# Patient Record
Sex: MALE | Race: WHITE | NOT HISPANIC OR LATINO | Employment: PART TIME | URBAN - NONMETROPOLITAN AREA
[De-identification: names, ages, dates, MRNs, and addresses within clinical notes are randomized per-mention and may not be internally consistent; named-entity substitution may affect disease eponyms.]

---

## 2017-03-29 ENCOUNTER — NON-PROVIDER VISIT (OUTPATIENT)
Dept: URGENT CARE | Facility: PHYSICIAN GROUP | Age: 42
End: 2017-03-29

## 2017-03-29 DIAGNOSIS — Z02.1 PRE-EMPLOYMENT DRUG SCREENING: ICD-10-CM

## 2017-03-29 PROCEDURE — 8907 PR URINE COLLECT ONLY: Performed by: PHYSICIAN ASSISTANT

## 2018-11-26 ENCOUNTER — OCCUPATIONAL MEDICINE (OUTPATIENT)
Dept: URGENT CARE | Facility: PHYSICIAN GROUP | Age: 43
End: 2018-11-26
Payer: COMMERCIAL

## 2018-11-26 VITALS
OXYGEN SATURATION: 100 % | WEIGHT: 215 LBS | TEMPERATURE: 97.9 F | RESPIRATION RATE: 14 BRPM | HEART RATE: 62 BPM | HEIGHT: 67 IN | DIASTOLIC BLOOD PRESSURE: 108 MMHG | SYSTOLIC BLOOD PRESSURE: 170 MMHG | BODY MASS INDEX: 33.74 KG/M2

## 2018-11-26 DIAGNOSIS — S65.311A LACERATION OF DEEP PALMAR ARCH OF RIGHT HAND, INITIAL ENCOUNTER: ICD-10-CM

## 2018-11-26 DIAGNOSIS — Z02.1 PRE-EMPLOYMENT DRUG SCREENING: Primary | ICD-10-CM

## 2018-11-26 LAB
BREATH ALCOHOL COMMENT: NORMAL
POC BREATHALIZER: NORMAL PERCENT (ref 0–0.01)

## 2018-11-26 PROCEDURE — 12001 RPR S/N/AX/GEN/TRNK 2.5CM/<: CPT | Mod: 29 | Performed by: PHYSICIAN ASSISTANT

## 2018-11-26 PROCEDURE — 80305 DRUG TEST PRSMV DIR OPT OBS: CPT | Performed by: PHYSICIAN ASSISTANT

## 2018-11-26 PROCEDURE — 90715 TDAP VACCINE 7 YRS/> IM: CPT | Mod: 29 | Performed by: PHYSICIAN ASSISTANT

## 2018-11-26 PROCEDURE — 82075 ASSAY OF BREATH ETHANOL: CPT | Performed by: PHYSICIAN ASSISTANT

## 2018-11-26 NOTE — PROGRESS NOTES
"Chief Complaint   Patient presents with   • Work-Related Injury     NEW Wc for R knuckle laceration/ DOI: 11/26/18/ [employer]       HISTORY OF PRESENT ILLNESS: Patient is a 43 y.o. male who presents today for the following:    DOI: 11/26/18, first visit  Patient was trying to pull a core when he got his hand caught between the corner and another firm space, causing a laceration on the ulnar aspect of the second MCP of the right hand.  Patient is full range of motion.  He denies distal paresthesias.  His last tetanus shot was more than 10 years ago.    There are no active problems to display for this patient.      Allergies:Patient has no known allergies.    No current [employer]ordered outpatient prescriptions on file.     No current [employer]ordered facility-administered medications on file.        No past medical history on file.    Social History   Substance Use Topics   • Smoking status: Former Smoker     Types: Cigarettes   • Smokeless tobacco: Current User     Types: Chew   • Alcohol use 0.6 oz/week     1 Cans of beer per week       No family status information on file.   No family history on file.    Review of Systems:   Constitutional ROS: No unexpected change in weight, No weakness, No fatigue  Eye ROS: No recent significant change in vision, No eye pain, redness, discharge  Ear ROS: No drainage, No tinnitus or vertigo, No recent change in hearing  Mouth/Throat ROS: No teeth or gum problems, No bleeding gums, No tongue complaints  Neck ROS: No swollen glands, No significant pain in neck  Pulmonary ROS: No chronic cough, sputum, or hemoptysis, No dyspnea on exertion, No wheezing  Cardiovascular ROS: No diaphoresis, No edema, No palpitations  Gastrointestinal ROS: No change in bowel habits, No significant change in appetite, No nausea, vomiting, diarrhea, or constipation    Exam:  Blood pressure (!) 170/108, pulse 62, temperature 36.6 °C (97.9 °F), temperature source Temporal, resp. rate 14, height 1.702 m (5' 7\"), " weight 97.5 kg (215 lb), SpO2 100 %.  General: Well developed, well nourished. No distress.  HEENT: Head is grossly normal.  Pulmonary: Unlabored respiratory effort.    Cardiovascular: Brisk capillary refill in the right hand, specifically the second digit.  Skin: 2 cm laceration on the ulnar aspect of the second MCP of the right hand.  Appears to be in the soft tissue only.  Unable to visualize any bone or tendon.  Full range of motion second digit of the right hand.  Strength is intact.  Neurologic: Grossly nonfocal. No facial asymmetry noted.  Psych: Normal mood. Alert and oriented x3. Judgment and insight is normal.    LACERATION REPAIR PROCEDURE NOTE  The patient's identification was confirmed and consent was obtained.  This procedure was performed by Sylvia Buchanan PA-C at 1020 hrs.  Site: Right hand, second MCP  Sterile procedures observed  Anesthetic used (type and amt): 2% lidocaine without epinephrine, 4 mL  Suture type/size: 3-0 Ethilon  Length: 2 cm  # of Sutures/staples: 4 interrupted sutures    Complexity: Simple     Tetanus: Updated in clinic      Assessment/Plan:  Discussed wound care at home.  Must keep covered at work.  No direct pressure against laceration while working.  Return to clinic in 10-14 days for suture removal, sooner for any signs of infection as discussed in clinic.  1. Laceration of deep palmar arch of right hand, initial encounter  Tdap Vaccine =>6YO IM

## 2018-11-26 NOTE — LETTER
"EMPLOYEE’S CLAIM FOR COMPENSATION/ REPORT OF INITIAL TREATMENT  FORM C-4    EMPLOYEE’S CLAIM - PROVIDE ALL INFORMATION REQUESTED   First Name  Jennifer Last Name  Jagdeep Birthdate                    1975                Sex  male Claim Number   Home Address  [address]  Age  43 y.o. Height  1.702 m (5' 7\") Weight  97.5 kg (215 lb) [facility]     [facility] [place] [facility] [place]  [address] Telephone  [phone] (home)    Mailing Address  [address]  [facility]  [address] Primary Language Spoken  English    Insurer   Third Party    Gisele Bergeron   Employee's Occupation (Job Title) When Injury or Occupational Disease Occurred      Employer's Name  [employer]  Telephone  [phone]    Employer Address  [address]  [facility] [place] [facility] [place]  [address]   Date of Injury  11/26/2018               Hour of Injury  7:45 AM Date Employer Notified  11/26/2018 Last Day of Work after Injury or Occupational Disease  11/26/2018 Supervisor to Whom Injury Reported     Address or Location of Accident (if applicable)  [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 (Be specific an answer in detail. Use additional sheet if necessary)  i was loosing the core and core got loose core and sny slip in between core and arm of the reel stand   If you believe that you have an occupational disease, when did you first have knowledge of the disability and it relationship to your employment?  n/a Witnesses to the Accident  none      Nature of Injury or Occupational Disease  Laceration  Part(s) of Body Injured or Affected  Hand (R), ,     I certify that the above is true and correct to the best of my knowledge and that I have provided this information in order to obtain the benefits of [place] " [place]).  I hereby authorize any physician, chiropractor, surgeon, practitioner, or other person, any hospital, including [facility] or [facility], any medical service organization, any insurance company, or other institution or organization to release to each other, any medical or other information, including benefits paid or payable, pertinent to this injury or disease, except information relative to diagnosis, treatment and/or counseling for AIDS, psychological conditions, alcohol or controlled substances, for which I must give specific authorization.  A Photostat of this authorization shall be as valid as the original.     Date 11/26/2018   Place [facility]   Employee’s Signature   THIS REPORT MUST BE COMPLETED AND MAILED WITHIN 3 WORKING DAYS OF TREATMENT   Place  [facility]  Name of Facility  [place]   Date  11/26/2018 Diagnosis  (S65.311A) Laceration of deep palmar arch of right hand, initial encounter Is there evidence the injured employee was under the influence of alcohol and/or another controlled substance at the time of accident?   Hour  9:58 AM Description of Injury or Disease  The encounter diagnosis was Laceration of deep palmar arch of right hand, initial encounter. No   Treatment  Assessment/Plan:  Discussed wound care at home.  Must keep covered at work.  No direct pressure against laceration while working.  Return to clinic in 10-14 days for suture removal, sooner for any signs of infection as discussed in clinic.  1. Laceration of deep palmar arch of right hand, initial encounter  Tdap Vaccine =>6YO IM  Have you advised the patient to remain off work five days or more? No   X-Ray Findings      If Yes   From Date  To Date      From information given by the employee, together with medical evidence, can you directly connect this injury or occupational disease as job  "incurred?  Yes If No Full Duty  No Modified Duty  Yes   Is additional medical care by a physician indicated?  Yes If Modified Duty, Specify any Limitations / Restrictions  See D39   Do you know of any previous injury or disease contributing to this condition or occupational disease?                            No   Date  11/26/2018 Print Doctor’s Name Sha Buchanan P.A.-C. I certify the employer’s copy of  this form was mailed on:   Address  [address] Insurer’s Use Only     [place] [facility] [place] Zip  [address]    Provider’s Tax ID Number  [id number] Telephone  Dept: [phone]        e-SHA Gottlieb P.A.-C.   e-Signature: Dr. Ariel Gibson, Medical Director Degree  PAC        ORIGINAL-TREATING PHYSICIAN OR CHIROPRACTOR    PAGE 2-INSURER/TPA    PAGE 3-EMPLOYER    PAGE 4-EMPLOYEE             Form C-4 (rev10/07)              BRIEF DESCRIPTION OF RIGHTS AND BENEFITS  (Pursuant to [place]RS 616C.050)    Notice of Injury or Occupational Disease (Incident Report Form C-1): If an injury or occupational disease (OD) arises out of and in the  course of employment, you must provide written notice to your employer as soon as practicable, but no later than 7 days after the accident or  OD. Your employer shall maintain a sufficient supply of the required forms.    Claim for Compensation (Form C-4): If medical treatment is sought, the form C-4 is available at the place of initial treatment. A completed  \"Claim for Compensation\" (Form C-4) must be filed within 90 days after an accident or OD. The treating physician or chiropractor must,  within 3 working days after treatment, complete and mail to the employer, the employer's insurer and third-party , the Claim for  Compensation.    Medical Treatment: If you require medical treatment for your on-the-job injury or OD, you may be required to select a physician or  chiropractor from a list provided by your workers’ compensation insurer, if it has " contracted with an Organization for Managed Care (MCO) or  Preferred Provider Organization (PPO) or providers of health care. If your employer has not entered into a contract with an MCO or PPO, you  may select a physician or chiropractor from the Panel of Physicians and Chiropractors. Any medical costs related to your industrial injury or  OD will be paid by your insurer.    Temporary Total Disability (TTD): If your doctor has certified that you are unable to work for a period of at least 5 consecutive days, or 5  cumulative days in a 20-day period, or places restrictions on you that your employer does not accommodate, you may be entitled to TTD  compensation.    Temporary Partial Disability (TPD): If the wage you receive upon reemployment is less than the compensation for TTD to which you are  entitled, the insurer may be required to pay you TPD compensation to make up the difference. TPD can only be paid for a maximum of 24  months.    Permanent Partial Disability (PPD): When your medical condition is stable and there is an indication of a PPD as a result of your injury or  OD, within 30 days, your insurer must arrange for an evaluation by a rating physician or chiropractor to determine the degree of your PPD. The  amount of your PPD award depends on the date of injury, the results of the PPD evaluation and your age and wage.    Permanent Total Disability (PTD): If you are medically certified by a treating physician or chiropractor as permanently and totally disabled  and have been granted a PTD status by your insurer, you are entitled to receive monthly benefits not to exceed 66 2/3% of your average  monthly wage. The amount of your PTD payments is subject to reduction if you previously received a PPD award.    Vocational Rehabilitation Services: You may be eligible for vocational rehabilitation services if you are unable to return to the job due to a  permanent physical impairment or permanent restrictions as a  result of your injury or occupational disease.    Transportation and Per Jeremy Reimbursement: You may be eligible for travel expenses and per jeremy associated with medical treatment.    Reopening: You may be able to reopen your claim if your condition worsens after claim closure.    Appeal Process: If you disagree with a written determination issued by the insurer or the insurer does not respond to your request, you may  appeal to the Department of Administration, , by following the instructions contained in your determination letter. You must  appeal the determination within 70 days from the date of the determination letter at [address], [address], [place], [place]  [address], or [address], [address], [place], [place] [address]. If you disagree with the  decision, you may appeal to the  Department of Administration, . You must file your appeal within 30 days from the date of the  decision  letter at [address], [address], [place], [place] [address], or [address] [facility] [address] [facility] [address], [place], [place] [address]. If you  disagree with a decision of an , you may file a petition for judicial review with the District Court. You must do so within 30  days of the Appeal Officer’s decision. You may be represented by an  at your own expense or you may contact the [facility] for possible  representation.    [place]  for Injured Workers (NAIW): If you disagree with a  decision, you may request that NAIW represent you  without charge at an  Hearing. For information regarding denial of benefits, you may contact the [facility] at: [address], [address], [place] [address], [phone], or [address], [place] [address], [phone]    To File a Complaint with the Division: If you wish to file a complaint with the  of the Division of  Industrial Relations (DIR),  please contact the Workers’ Compensation Section, [address], [address], [place], [place] [address], telephone [phone]) [phone], or  [address], [address], [place], [place] [address], telephone [phone].    For assistance with Workers’ Compensation Issues: you may contact the Office of the [facility] Consumer Health Assistance, [address] [place] [address], [address], [place], [place] [address], Toll Free 1-936.159.1645, Web site: http://[email or web address].[email or web address]., E-mail  [email or web address].                                                                                                                                                                                                                                   __________________________________________________________________                                                                   ___11/26/2018______________                Employee Name / Signature                                                                                                                                                       Date                                                                                                                                                                                                     D-2 (rev. 10/07)

## 2018-11-26 NOTE — LETTER
"[facility]  [address] [place] [address] [place] [address]  Phone:  [phone] - Fax:  [phone]   [facility] Progress Report and Disability Certification  Date of Service: 11/26/2018   No Show:  No  Date / Time of Next Visit: 12/10/2018   Claim Information   Patient Name: Jennifer Gannon  Claim Number:     Employer: [employer] Date of Injury: 11/26/2018     Insurer / TPA:   Shahla Bergeron ID / SSN:     Occupation:   Diagnosis: The encounter diagnosis was Laceration of deep palmar arch of right hand, initial encounter.    Medical Information   Related to Industrial Injury? Yes    Subjective Complaints:  DOI: 11/26/18, first visit  Patient was trying to pull a core when he got his hand caught between the corner and another firm space, causing a laceration on the ulnar aspect of the second MCP of the right hand.  Patient is full range of motion.  He denies distal paresthesias.  His last tetanus shot was more than 10 years ago.   Objective Findings: Blood pressure (!) 170/108, pulse 62, temperature 36.6 °C (97.9 °F), temperature source Temporal, resp. rate 14, height 1.702 m (5' 7\"), weight 97.5 kg (215 lb), SpO2 100 %.  General: Well developed, well nourished. No distress.  HEENT: Head is grossly normal.  Pulmonary: Unlabored respiratory effort.    Cardiovascular: Brisk capillary refill in the right hand, specifically the second digit.  Skin: 2 cm laceration on the ulnar aspect of the second MCP of the right hand.  Appears to be in the soft tissue only.  Unable to visualize any bone or tendon.  Full range of motion second digit of the right hand.  Strength is intact.  Neurologic: Grossly nonfocal. No facial asymmetry noted.  Psych: Normal mood. Alert and oriented x3. Judgment and insight is normal.    LACERATION REPAIR PROCEDURE NOTE  The patient's identification was confirmed and consent was obtained.  This procedure was performed by Sylvia Buchanan PA-C at " 1020 hrs.  Site: Right hand, second MCP  Sterile procedures observed  Anesthetic used (type and amt): 2% lidocaine without epinephrine, 4 mL  Suture type/size: 3-0 Ethilon  Length: 2 cm  # of Sutures/staples: 4 interrupted sutures    Complexity: Simple     Tetanus: Updated in clinic   Pre-Existing Condition(s):     Assessment:   Initial Visit    Status: Additional Care Required  Permanent Disability:No    Plan:      Diagnostics:      Comments:       Disability Information   Status: Released to Restricted Duty    From:  11/26/2018  Through: 12/10/2018 Restrictions are: Temporary   Physical Restrictions   Sitting:    Standing:    Stooping:    Bending:      Squatting:    Walking:    Climbing:    Pushing:      Pulling:    Other:    Reaching Above Shoulder (L):   Reaching Above Shoulder (R):       Reaching Below Shoulder (L):    Reaching Below Shoulder (R):      Not to exceed Weight Limits   Carrying(hrs):   Weight Limit(lb):   Lifting(hrs):   Weight  Limit(lb):     Comments: Only restriction is wound must be covered at work and no direct pressure against the wound.    Repetitive Actions   Hands: i.e. Fine Manipulations from Grasping:     Feet: i.e. Operating Foot Controls:     Driving / Operate Machinery:     Physician Name: Sha Buchanan P.A.-C. Physician Signature: SHA Wallace P.A.-C. e-Signature: Dr. Ariel Gibson, Medical Director   Clinic Name / Location: [address] [address] Clinic Phone Number: Dept: [phone]   Appointment Time: 9:35 Am Visit Start Time: 9:58 AM   Check-In Time:  9:45 Am Visit Discharge Time:  10:43 am   Original-Treating Physician or Chiropractor    Page 2-Insurer/TPA    Page 3-Employer    Page 4-Employee

## 2018-12-10 ENCOUNTER — OCCUPATIONAL MEDICINE (OUTPATIENT)
Dept: URGENT CARE | Facility: PHYSICIAN GROUP | Age: 43
End: 2018-12-10
Payer: COMMERCIAL

## 2018-12-10 VITALS
TEMPERATURE: 99.2 F | BODY MASS INDEX: 34.3 KG/M2 | WEIGHT: 219 LBS | DIASTOLIC BLOOD PRESSURE: 110 MMHG | SYSTOLIC BLOOD PRESSURE: 168 MMHG | RESPIRATION RATE: 14 BRPM | OXYGEN SATURATION: 96 % | HEART RATE: 78 BPM

## 2018-12-10 DIAGNOSIS — S65.311D: ICD-10-CM

## 2018-12-10 ASSESSMENT — ENCOUNTER SYMPTOMS
NAUSEA: 0
VOMITING: 0
CHILLS: 0
FEVER: 0

## 2018-12-10 NOTE — PROGRESS NOTES
Subjective:   Jennifer Gannon is a 43 y.o. male who presents for Follow-Up ( [facility] for R hand lac/ stitches removal)        DOI: 11/26/18. This is pt's second visit.  Patient was trying to pull a core when he got his hand caught between the corner and another firm space, causing a laceration on the ulnar aspect of the second MCP of the right hand.  Patient is full range of motion.  He denies distal paresthesias.        Review of Systems   Constitutional: Negative for chills and fever.   Gastrointestinal: Negative for nausea and vomiting.       PMH:  has no past medical history on file.  MEDS: No current outpatient prescriptions on file.  ALLERGIES: No Known Allergies  SURGHX: No past surgical history on file.  SOCHX:  reports that he has quit smoking. His smoking use included Cigarettes. His smokeless tobacco use includes Chew. He reports that he drinks about 0.6 oz of alcohol per week . He reports that he does not use drugs.  FH: Family history was reviewed, no pertinent findings to report   Objective:   BP (!) 168/110   Pulse 78   Temp 37.3 °C (99.2 °F) (Temporal)   Resp 14   Wt 99.3 kg (219 lb)   SpO2 96%   BMI 34.30 kg/m²   Physical Exam   Constitutional: He appears well-developed and well-nourished.   HENT:   Head: Normocephalic and atraumatic.   Neck: Neck supple.   Pulmonary/Chest: Effort normal. No respiratory distress.   Musculoskeletal:        Right hand: He exhibits laceration. He exhibits normal range of motion, no bony tenderness, normal capillary refill and no swelling. Normal sensation noted. Normal strength noted.   Neurological: He is alert.   Skin: Skin is warm and dry.   2 cm well healing laceration extending from volar surface of hand in between  Digits #2 and #3.   Psychiatric: He has a normal mood and affect. His behavior is normal. Thought content normal.   Vitals reviewed.        Assessment/Plan:   1. Laceration of deep palmar arch of hand, right, subsequent encounter    4 sutures removed.   Proximal wound is well approximated and nearly fully healed.  Distal end of wound is not fully healed.  No benefit of leaving sutures in place as the suture place in this region is no successfully re-approximating wound.  Distal end is under more tension. I applied glue to the wound to help maintain closure.  It should continue to heal well.    Signs of infection, to include: redness, swelling, increased pain, purulent discharge, red streaking from wound site, N/V, and F/C discussed with patient.  Pt instructed to RTC or go to the ER if he or she should experience these.    Differential diagnosis, natural history, supportive care, and indications for immediate follow-up discussed.

## 2018-12-10 NOTE — LETTER
[facility] [place] [place] [address]  Phone:  [phone] - Fax:  [phone]   [facility] Progress Report and Disability Certification  Date of Service: 12/10/2018   No Show:  No  Date / Time of Next Visit:  12/14/18   Claim Information   Patient Name: Jennifer Gannon  Claim Number:     Employer: [employer]  Date of Injury: 11/26/2018     Insurer / TPA: Shahla Bergeron  ID / SSN:     Occupation:  Utility Diagnosis: The encounter diagnosis was Laceration of deep palmar arch of hand, right, subsequent encounter.    Medical Information   Related to Industrial Injury? Yes    Subjective Complaints:  DOI: 11/26/18. This is pt's second visit.  Patient was trying to pull a core when he got his hand caught between the corner and another firm space, causing a laceration on the ulnar aspect of the second MCP of the right hand.  Patient is full range of motion.  He denies distal paresthesias.  Currently performing full duties with hand protected with glove.      MH:  has no past medical history on file.  MEDS: No current outpatient prescriptions on file.  ALLERGIES: No Known Allergies  SURGHX: No past surgical history on file.  SOCHX:  reports that he has quit smoking. His smoking use included Cigarettes. His smokeless tobacco use includes Chew. He reports that he drinks about 0.6 oz of alcohol per week . He reports that he does not use drugs.  FH: Family history was reviewed, no pertinent findings to report     Objective Findings: BP (!) 168/110   Pulse 78   Temp 37.3 °C (99.2 °F) (Temporal)   Resp 14   Wt 99.3 kg (219 lb)   SpO2 96%   BMI 34.30 kg/m²   Physical Exam   Constitutional: He appears well-developed and well-nourished.   HENT:   Head: Normocephalic and atraumatic.   Neck: Neck supple.   Pulmonary/Chest: Effort normal. No respiratory distress.   Musculoskeletal:        Right hand: He exhibits laceration. He exhibits normal range of  motion, no bony tenderness, normal capillary refill and no swelling. Normal sensation noted. Normal strength noted.   Neurological: He is alert.   Skin: Skin is warm and dry.   2 cm well healing laceration extending from volar surface of hand in between  Digits #2 and #3.   Psychiatric: He has a normal mood and affect. His behavior is normal. Thought content normal.   Vitals reviewed.     Pre-Existing Condition(s):     Assessment:   Condition Improved    Status: Additional Care Required  Permanent Disability:No    Plan:      Diagnostics:      Comments:  4 sutures removed.  Proximal wound is well approximated and nearly fully healed.  Distal end of wound is not fully healed.  No benefit of leaving sutures in place as the suture place in this region is not successfully re-approximating wound.  Distal   end is under more tension so pt is at risk for wound splitting back open.  I discussed this risk with the patient.  I applied glue to the wound to help maintain closure.  It should continue to heal well, provided he can protect it and limit use of th  e hand.        Disability Information   Status: Released to Restricted Duty    From:     Through:   Restrictions are:     Physical Restrictions   Sitting:    Standing:    Stooping:    Bending:      Squatting:    Walking:    Climbing:    Pushing:      Pulling:    Other:    Reaching Above Shoulder (L):   Reaching Above Shoulder (R):       Reaching Below Shoulder (L):    Reaching Below Shoulder (R):      Not to exceed Weight Limits   Carrying(hrs):   Weight Limit(lb):   Lifting(hrs):   Weight  Limit(lb):     Comments: Although pt was performing full duties, this is interfering with his healing. I would like him to avoid repetitive grasping and tension to the area. I will see him back on Friday for a recheck. Must continue wearing clean gloves at work for protection.    Signs of infection, to include: redness, swelling, increased pain, purulent discharge, red streaking from  wound site, N/V, and F/C discussed with patient.  Pt instructed to RTC or go to the ER if he should experience these.      Repetitive Actions   Hands: i.e. Fine Manipulations from Grasping: < or = to 2 hrs/day   Feet: i.e. Operating Foot Controls:     Driving / Operate Machinery:     Physician Name: Naman Raman P.A.-C. Physician Signature: NAMAN Grider P.A.-C. e-Signature: Dr. Ariel Gibson, Medical Director   Clinic Name / Location: [address] [address] Clinic Phone Number: Dept: [phone]   Appointment Time: 10:00 Am Visit Start Time: 10:19 AM   Check-In Time:  9:47 Am Visit Discharge Time:  11:17AM   Original-Treating Physician or Chiropractor    Page 2-Insurer/TPA    Page 3-Employer    Page 4-Employee

## 2018-12-14 ENCOUNTER — OCCUPATIONAL MEDICINE (OUTPATIENT)
Dept: URGENT CARE | Facility: PHYSICIAN GROUP | Age: 43
End: 2018-12-14
Payer: COMMERCIAL

## 2018-12-14 VITALS
OXYGEN SATURATION: 98 % | BODY MASS INDEX: 34.62 KG/M2 | TEMPERATURE: 98.9 F | DIASTOLIC BLOOD PRESSURE: 90 MMHG | HEART RATE: 78 BPM | SYSTOLIC BLOOD PRESSURE: 150 MMHG | WEIGHT: 220.6 LBS | HEIGHT: 67 IN | RESPIRATION RATE: 16 BRPM

## 2018-12-14 DIAGNOSIS — S65.311D: ICD-10-CM

## 2018-12-14 PROCEDURE — 99213 OFFICE O/P EST LOW 20 MIN: CPT | Mod: 29 | Performed by: NURSE PRACTITIONER

## 2018-12-14 ASSESSMENT — PAIN SCALES - GENERAL: PAINLEVEL: 2=MINIMAL-SLIGHT

## 2018-12-14 NOTE — LETTER
[facility] [place] [place] [address]  Phone:  [phone] - Fax:  [phone]   [facility] Progress Report and Disability Certification  Date of Service: 12/14/2018   No Show:  No  Date / Time of Next Visit: 12/28/2018   Claim Information   Patient Name: Jennifer Gannon  Claim Number:     Employer: [employer]  Date of Injury: 11/26/2018     Insurer / TPA:   Shahla Bergeron ID / SSN:     Occupation:   Diagnosis: The encounter diagnosis was Laceration of deep palmar arch of hand, right, subsequent encounter.    Medical Information   Related to Industrial Injury? Yes    Subjective Complaints:  DOI: 11/26/18. 3 rd visit  Patient was trying to pull a core when he got his hand caught between the corner and another firm space, causing a laceration on the ulnar aspect of the second MCP of the right hand.  Patient is full range of motion.  He denies distal paresthesias.  Currently performing full duties with hand protected with glove.     Objective Findings: Right hand- 2 cm well healing laceration extending from volar surface of hand in between  digits #2 and #3. No active dehiscence noted. Distal end of lac scarring down appropriately, not able to pull apart wound edges. Residual scab present. FROM all associated joints.   Pre-Existing Condition(s):     Assessment:   Condition Improved    Status: Additional Care Required  Permanent Disability:No    Plan:      Diagnostics:      Comments:  Discussed with patient continue to protect hand and be mindful of how much stress he applies to healing lac when making a fist. He understands  Daily ROM exercises and instructed pt on how to massage scar to encourage pliability of scar tissue  RTC i  n 2 weeks for FV, anticipate discharge MMI    Disability Information   Status: Released to Full Duty    From:  12/14/2018  Through: 12/28/2018 Restrictions are:     Physical Restrictions   Sitting:    Standing:   Stooping:    Bending:      Squatting:    Walking:    Climbing:    Pushing:      Pulling:    Other:    Reaching Above Shoulder (L):   Reaching Above Shoulder (R):       Reaching Below Shoulder (L):    Reaching Below Shoulder (R):      Not to exceed Weight Limits   Carrying(hrs):   Weight Limit(lb):   Lifting(hrs):   Weight  Limit(lb):     Comments:      Repetitive Actions   Hands: i.e. Fine Manipulations from Grasping:     Feet: i.e. Operating Foot Controls:     Driving / Operate Machinery:     Physician Name: ALVAREZ Alejandre Physician Signature: JAIRO Ramos e-Signature: Dr. Ariel Gibson, Medical Director   Clinic Name / Location: [address] [address] Clinic Phone Number: Dept: [phone]   Appointment Time: 3:30 Pm Visit Start Time: 3:34 PM   Check-In Time:  3:14 Pm Visit Discharge Time: 3:47 Pm    Original-Treating Physician or Chiropractor    Page 2-Insurer/TPA    Page 3-Employer    Page 4-Employee

## 2018-12-15 NOTE — PROGRESS NOTES
"Subjective:      Jennifer Gannon is a 43 y.o. male who presents with Follow-Up and Laceration (right hand)      DOI: 11/26/18. 3 rd visit  Patient was trying to pull a core when he got his hand caught between the corner and another firm space, causing a laceration on the ulnar aspect of the second MCP of the right hand.  Patient is full range of motion.  He denies distal paresthesias.  Currently performing full duties with hand protected with glove.       HPI    Review of Systems   Skin:        Healing right hand lac   All other systems reviewed and are negative.    No past medical history on file. No past surgical history on file.   Social History     Social History   • Marital status:      Spouse name: N/A   • Number of children: N/A   • Years of education: N/A     Occupational History   • Not on file.     Social History Main Topics   • Smoking status: Former Smoker     Types: Cigarettes   • Smokeless tobacco: Current User     Types: Chew   • Alcohol use 0.6 oz/week     1 Cans of beer per week   • Drug use: No   • Sexual activity: Not on file     Other Topics Concern   • Not on file     Social History Narrative   • No narrative on file          Objective:     /90   Pulse 78   Temp 37.2 °C (98.9 °F) (Temporal)   Resp 16   Ht 1.702 m (5' 7\")   Wt 100.1 kg (220 lb 9.6 oz)   SpO2 98%   BMI 34.55 kg/m²      Physical Exam   Constitutional: He is oriented to person, place, and time. Vital signs are normal. He appears well-developed and well-nourished.   HENT:   Head: Normocephalic and atraumatic.   Eyes: Pupils are equal, round, and reactive to light. EOM are normal.   Neck: Normal range of motion.   Cardiovascular: Normal rate and regular rhythm.    Pulmonary/Chest: Effort normal.   Musculoskeletal: Normal range of motion.   Neurological: He is alert and oriented to person, place, and time.   Skin: Skin is warm and dry. Capillary refill takes less than 2 seconds.   Psychiatric: He has a normal mood and " affect. His speech is normal and behavior is normal. Thought content normal.   Vitals reviewed.      Right hand- 2 cm well healing laceration extending from volar surface of hand in between  digits #2 and #3. No active dehiscence noted. Distal end of lac scarring down appropriately, not able to pull apart wound edges. Residual scab present. FROM all associated joints.       Assessment/Plan:     1. Laceration of deep palmar arch of hand, right, subsequent encounter    Discussed with patient continue to protect hand and be mindful of how much stress he applies to healing lac when making a fist. He understands  Daily ROM exercises and instructed pt on how to massage scar to encourage pliability of scar tissue  RTC in 2 weeks for F[facility], anticipate discharge MMI

## 2018-12-28 ENCOUNTER — OCCUPATIONAL MEDICINE (OUTPATIENT)
Dept: URGENT CARE | Facility: PHYSICIAN GROUP | Age: 43
End: 2018-12-28
Payer: COMMERCIAL

## 2018-12-28 VITALS
HEART RATE: 95 BPM | BODY MASS INDEX: 35.47 KG/M2 | SYSTOLIC BLOOD PRESSURE: 166 MMHG | HEIGHT: 67 IN | OXYGEN SATURATION: 97 % | WEIGHT: 226 LBS | DIASTOLIC BLOOD PRESSURE: 84 MMHG | RESPIRATION RATE: 16 BRPM | TEMPERATURE: 98.2 F

## 2018-12-28 DIAGNOSIS — S65.311D LACERATION OF DEEP PALMAR ARCH OF RIGHT HAND, SUBSEQUENT ENCOUNTER: Primary | ICD-10-CM

## 2018-12-28 PROCEDURE — 99213 OFFICE O/P EST LOW 20 MIN: CPT | Mod: 29 | Performed by: PHYSICIAN ASSISTANT

## 2018-12-28 ASSESSMENT — ENCOUNTER SYMPTOMS
VOMITING: 0
CHILLS: 0
ABDOMINAL PAIN: 0
FEVER: 0
DIARRHEA: 0
SHORTNESS OF BREATH: 0
COUGH: 0
NAUSEA: 0
CONSTIPATION: 0

## 2018-12-28 NOTE — LETTER
[facility] [place] [place] [address]  Phone:  [phone] - Fax:  [phone]   [facility] Progress Report and Disability Certification  Date of Service: 12/28/2018   No Show:  No  Date / Time of Next Visit:     Claim Information   Patient Name: Jennifer Gannon  Claim Number:     Employer: [employer]  Date of Injury: 11/26/2018     Insurer / TPA:   [employer] ID / SSN:     Occupation:   Diagnosis: The encounter diagnosis was Laceration of deep palmar arch of right hand, subsequent encounter.    Medical Information   Related to Industrial Injury? Yes    Subjective Complaints:  Copied from visit on 11/26/18 - 11/26/18, first visit  Patient was trying to pull a core when he got his hand caught between the corner and another firm space, causing a laceration on the ulnar aspect of the second MCP of the right hand.  Patient is full range of motion.  He denies distal paresthesias.  His last tetanus shot was more than 10 years ago.    F/u 12/28/18: Patient symptoms much improved.  He is massaging scar daily.  He is back to full duty at work without complication.  He denies difficulty or pain with range of motion.  No numbness or tingling.  He does have a history of keloid scars.  No signs of infection, increased pain, warmth or erythema.     Objective Findings: Constitutional: He is oriented to person, place, and time. He appears well-developed and well-nourished. No distress.   HENT:   Head: Normocephalic and atraumatic.   Eyes: Pupils are equal, round, and reactive to light. Conjunctivae are normal.   Cardiovascular: Normal rate and regular rhythm.    Pulmonary/Chest: Effort normal and breath sounds normal.   Musculoskeletal:        Hands:  Right hand: Well-healed scar.  No signs of infection.  Distal N/V intact.  ROM,  strength intact.   Neurological: He is alert and oriented to person, place, and time.   Skin: Skin is warm and  dry.   Psychiatric: He has a normal mood and affect. His behavior is normal.   Vitals reviewed.     Pre-Existing Condition(s):     Assessment:   Condition Improved    Status: Discharged /  MMI  Permanent Disability:No    Plan:      Diagnostics:      Comments:       Disability Information   Status: Released to Full Duty    From:  12/28/2018  Through:   Restrictions are:     Physical Restrictions   Sitting:    Standing:    Stooping:    Bending:      Squatting:    Walking:    Climbing:    Pushing:      Pulling:    Other:    Reaching Above Shoulder (L):   Reaching Above Shoulder (R):       Reaching Below Shoulder (L):    Reaching Below Shoulder (R):      Not to exceed Weight Limits   Carrying(hrs):   Weight Limit(lb):   Lifting(hrs):   Weight  Limit(lb):     Comments: Patient released to I, continue to monitor area.  If patient notes any signs of infection return to clinic immediately for reevaluation.    Repetitive Actions   Hands: i.e. Fine Manipulations from Grasping:     Feet: i.e. Operating Foot Controls:     Driving / Operate Machinery:     Physician Name: Patricia Pisano P.A.-C. Physician Signature: PATRICIA Lan P.A.-C. e-Signature: Dr. Ariel Gibson, Medical Director   Clinic Name / Location: [address] [address] Clinic Phone Number: Dept: [phone]   Appointment Time: 3:30 Pm Visit Start Time: 4:26 PM   Check-In Time:  3:26 Pm Visit Discharge Time:  4:44PM   Original-Treating Physician or Chiropractor    Page 2-Insurer/TPA    Page 3-Employer    Page 4-Employee

## 2018-12-29 NOTE — PROGRESS NOTES
"Subjective:   Jennifer Gannon is a 43 y.o. male who presents for Follow-Up (WC right hand injury )    Copied from visit on 11/26/18 - 11/26/18, first visit  Patient was trying to pull a core when he got his hand caught between the corner and another firm space, causing a laceration on the ulnar aspect of the second MCP of the right hand.  Patient is full range of motion.  He denies distal paresthesias.  His last tetanus shot was more than 10 years ago.    F/u 12/28/18: Patient symptoms much improved.  He is massaging scar daily.  He is back to full duty at work without complication.  He denies difficulty or pain with range of motion.  No numbness or tingling.  He does have a history of keloid scars.  No signs of infection, increased pain, warmth or erythema.     HPI  Review of Systems   Constitutional: Negative for chills, fever and malaise/fatigue.   Respiratory: Negative for cough and shortness of breath.    Gastrointestinal: Negative for abdominal pain, constipation, diarrhea, nausea and vomiting.   All other systems reviewed and are negative.      PMH:  has no past medical history on file.  MEDS: No current outpatient prescriptions on file.  ALLERGIES: No Known Allergies  SURGHX: No past surgical history on file.  SOCHX:  reports that he has quit smoking. His smoking use included Cigarettes. His smokeless tobacco use includes Chew. He reports that he drinks about 0.6 oz of alcohol per week . He reports that he does not use drugs.  No family history on file.     Objective:   BP (!) 166/84   Pulse 95   Temp 36.8 °C (98.2 °F) (Temporal)   Resp 16   Ht 1.702 m (5' 7\")   Wt 102.5 kg (226 lb)   SpO2 97%   BMI 35.40 kg/m²     Physical Exam   Constitutional: He is oriented to person, place, and time. He appears well-developed and well-nourished. No distress.   HENT:   Head: Normocephalic and atraumatic.   Eyes: Pupils are equal, round, and reactive to light. Conjunctivae are normal.   Cardiovascular: Normal rate " and regular rhythm.    Pulmonary/Chest: Effort normal and breath sounds normal.   Musculoskeletal:        Hands:  Right hand: Well-healed scar.  No signs of infection.  Distal N/V intact.  ROM,  strength intact.   Neurological: He is alert and oriented to person, place, and time.   Skin: Skin is warm and dry.   Psychiatric: He has a normal mood and affect. His behavior is normal.   Vitals reviewed.      Assessment/Plan:     1. Laceration of deep palmar arch of right hand, subsequent encounter       Patient symptoms improved, patient tolerating full duty at work without complication.  Patient released to [facility], D [facility] provided.      If symptoms worsen or persist patient can contact me or return to clinic for follow-up.  Red flags and emergency room precautions discussed.  Patient appears understanding of information.

## 2019-05-24 ENCOUNTER — NON-PROVIDER VISIT (OUTPATIENT)
Dept: URGENT CARE | Facility: PHYSICIAN GROUP | Age: 44
End: 2019-05-24

## 2019-05-24 DIAGNOSIS — Z02.1 PRE-EMPLOYMENT DRUG SCREENING: ICD-10-CM

## 2019-05-24 LAB
AMP AMPHETAMINE: NORMAL
BAR BARBITURATES: NORMAL
BZO BENZODIAZEPINES: NORMAL
COC COCAINE: NORMAL
INT CON NEG: NORMAL
INT CON POS: NORMAL
MDMA ECSTASY: NORMAL
MET METHAMPHETAMINES: NORMAL
MTD METHADONE: NORMAL
OPI OPIATES: NORMAL
OXY OXYCODONE: NORMAL
PCP PHENCYCLIDINE: NORMAL
POC URINE DRUG SCREEN OCDRS: NORMAL
THC: NORMAL

## 2019-05-24 PROCEDURE — 80305 DRUG TEST PRSMV DIR OPT OBS: CPT | Performed by: FAMILY MEDICINE

## 2020-10-16 ENCOUNTER — OFFICE VISIT (OUTPATIENT)
Dept: URGENT CARE | Facility: PHYSICIAN GROUP | Age: 45
End: 2020-10-16

## 2020-10-16 ENCOUNTER — HOSPITAL ENCOUNTER (EMERGENCY)
Facility: MEDICAL CENTER | Age: 45
End: 2020-10-16
Attending: EMERGENCY MEDICINE

## 2020-10-16 VITALS
DIASTOLIC BLOOD PRESSURE: 96 MMHG | OXYGEN SATURATION: 91 % | HEART RATE: 73 BPM | HEIGHT: 67 IN | BODY MASS INDEX: 36.99 KG/M2 | SYSTOLIC BLOOD PRESSURE: 164 MMHG | RESPIRATION RATE: 17 BRPM | TEMPERATURE: 96.6 F | WEIGHT: 235.67 LBS

## 2020-10-16 VITALS
WEIGHT: 228 LBS | BODY MASS INDEX: 35.79 KG/M2 | HEIGHT: 67 IN | SYSTOLIC BLOOD PRESSURE: 242 MMHG | TEMPERATURE: 98.6 F | DIASTOLIC BLOOD PRESSURE: 140 MMHG | HEART RATE: 100 BPM | OXYGEN SATURATION: 94 % | RESPIRATION RATE: 20 BRPM

## 2020-10-16 DIAGNOSIS — I10 ESSENTIAL HYPERTENSION: ICD-10-CM

## 2020-10-16 DIAGNOSIS — I16.9 HYPERTENSIVE CRISIS: ICD-10-CM

## 2020-10-16 DIAGNOSIS — N30.01 ACUTE CYSTITIS WITH HEMATURIA: ICD-10-CM

## 2020-10-16 LAB
ALBUMIN SERPL BCP-MCNC: 4.5 G/DL (ref 3.2–4.9)
ALBUMIN/GLOB SERPL: 1.5 G/DL
ALP SERPL-CCNC: 109 U/L (ref 30–99)
ALT SERPL-CCNC: 9 U/L (ref 2–50)
ANION GAP SERPL CALC-SCNC: 8 MMOL/L (ref 7–16)
APPEARANCE UR: ABNORMAL
APPEARANCE UR: ABNORMAL
AST SERPL-CCNC: 10 U/L (ref 12–45)
BACTERIA #/AREA URNS HPF: ABNORMAL /HPF
BASOPHILS # BLD AUTO: 0.3 % (ref 0–1.8)
BASOPHILS # BLD: 0.03 K/UL (ref 0–0.12)
BILIRUB SERPL-MCNC: 0.6 MG/DL (ref 0.1–1.5)
BILIRUB UR QL STRIP.AUTO: NEGATIVE
BILIRUB UR STRIP-MCNC: ABNORMAL MG/DL
BUN SERPL-MCNC: 13 MG/DL (ref 8–22)
CALCIUM SERPL-MCNC: 9 MG/DL (ref 8.5–10.5)
CHLORIDE SERPL-SCNC: 103 MMOL/L (ref 96–112)
CO2 SERPL-SCNC: 27 MMOL/L (ref 20–33)
COLOR UR AUTO: ABNORMAL
COLOR UR: ABNORMAL
CREAT SERPL-MCNC: 0.78 MG/DL (ref 0.5–1.4)
EKG IMPRESSION: NORMAL
EOSINOPHIL # BLD AUTO: 0.03 K/UL (ref 0–0.51)
EOSINOPHIL NFR BLD: 0.3 % (ref 0–6.9)
EPI CELLS #/AREA URNS HPF: NEGATIVE /HPF
ERYTHROCYTE [DISTWIDTH] IN BLOOD BY AUTOMATED COUNT: 36.5 FL (ref 35.9–50)
GLOBULIN SER CALC-MCNC: 3 G/DL (ref 1.9–3.5)
GLUCOSE SERPL-MCNC: 106 MG/DL (ref 65–99)
GLUCOSE UR STRIP.AUTO-MCNC: NEGATIVE MG/DL
GLUCOSE UR STRIP.AUTO-MCNC: NEGATIVE MG/DL
HCT VFR BLD AUTO: 48.3 % (ref 42–52)
HGB BLD-MCNC: 16.6 G/DL (ref 14–18)
HYALINE CASTS #/AREA URNS LPF: ABNORMAL /LPF
IMM GRANULOCYTES # BLD AUTO: 0.03 K/UL (ref 0–0.11)
IMM GRANULOCYTES NFR BLD AUTO: 0.3 % (ref 0–0.9)
KETONES UR STRIP.AUTO-MCNC: 15 MG/DL
KETONES UR STRIP.AUTO-MCNC: NEGATIVE MG/DL
LEUKOCYTE ESTERASE UR QL STRIP.AUTO: ABNORMAL
LEUKOCYTE ESTERASE UR QL STRIP.AUTO: NEGATIVE
LIPASE SERPL-CCNC: 25 U/L (ref 11–82)
LYMPHOCYTES # BLD AUTO: 1.44 K/UL (ref 1–4.8)
LYMPHOCYTES NFR BLD: 12.3 % (ref 22–41)
MCH RBC QN AUTO: 29.3 PG (ref 27–33)
MCHC RBC AUTO-ENTMCNC: 34.4 G/DL (ref 33.7–35.3)
MCV RBC AUTO: 85.2 FL (ref 81.4–97.8)
MICRO URNS: ABNORMAL
MONOCYTES # BLD AUTO: 0.67 K/UL (ref 0–0.85)
MONOCYTES NFR BLD AUTO: 5.7 % (ref 0–13.4)
NEUTROPHILS # BLD AUTO: 9.51 K/UL (ref 1.82–7.42)
NEUTROPHILS NFR BLD: 81.1 % (ref 44–72)
NITRITE UR QL STRIP.AUTO: NEGATIVE
NITRITE UR QL STRIP.AUTO: POSITIVE
NRBC # BLD AUTO: 0 K/UL
NRBC BLD-RTO: 0 /100 WBC
PH UR STRIP.AUTO: 8.5 [PH] (ref 5–8)
PH UR STRIP.AUTO: 8.5 [PH] (ref 5–8)
PLATELET # BLD AUTO: 311 K/UL (ref 164–446)
PMV BLD AUTO: 9.9 FL (ref 9–12.9)
POTASSIUM SERPL-SCNC: 4 MMOL/L (ref 3.6–5.5)
PROT SERPL-MCNC: 7.5 G/DL (ref 6–8.2)
PROT UR QL STRIP: 100 MG/DL
PROT UR QL STRIP: >=300 MG/DL
RBC # BLD AUTO: 5.67 M/UL (ref 4.7–6.1)
RBC # URNS HPF: >150 /HPF
RBC UR QL AUTO: ABNORMAL
RBC UR QL AUTO: ABNORMAL
SODIUM SERPL-SCNC: 138 MMOL/L (ref 135–145)
SP GR UR STRIP.AUTO: 1.02
SP GR UR STRIP.AUTO: 1.02
UROBILINOGEN UR STRIP-MCNC: 2 MG/DL
UROBILINOGEN UR STRIP.AUTO-MCNC: 1 MG/DL
WBC # BLD AUTO: 11.7 K/UL (ref 4.8–10.8)
WBC #/AREA URNS HPF: ABNORMAL /HPF

## 2020-10-16 PROCEDURE — 87086 URINE CULTURE/COLONY COUNT: CPT

## 2020-10-16 PROCEDURE — 81002 URINALYSIS NONAUTO W/O SCOPE: CPT | Performed by: NURSE PRACTITIONER

## 2020-10-16 PROCEDURE — 85025 COMPLETE CBC W/AUTO DIFF WBC: CPT

## 2020-10-16 PROCEDURE — 93005 ELECTROCARDIOGRAM TRACING: CPT

## 2020-10-16 PROCEDURE — 99214 OFFICE O/P EST MOD 30 MIN: CPT | Performed by: NURSE PRACTITIONER

## 2020-10-16 PROCEDURE — 99285 EMERGENCY DEPT VISIT HI MDM: CPT

## 2020-10-16 PROCEDURE — 87077 CULTURE AEROBIC IDENTIFY: CPT

## 2020-10-16 PROCEDURE — 700105 HCHG RX REV CODE 258: Performed by: EMERGENCY MEDICINE

## 2020-10-16 PROCEDURE — 700111 HCHG RX REV CODE 636 W/ 250 OVERRIDE (IP): Performed by: EMERGENCY MEDICINE

## 2020-10-16 PROCEDURE — 96365 THER/PROPH/DIAG IV INF INIT: CPT

## 2020-10-16 PROCEDURE — 81001 URINALYSIS AUTO W/SCOPE: CPT

## 2020-10-16 PROCEDURE — 96375 TX/PRO/DX INJ NEW DRUG ADDON: CPT

## 2020-10-16 PROCEDURE — 83690 ASSAY OF LIPASE: CPT

## 2020-10-16 PROCEDURE — 80053 COMPREHEN METABOLIC PANEL: CPT

## 2020-10-16 RX ORDER — LOSARTAN POTASSIUM 100 MG/1
100 TABLET ORAL DAILY
Qty: 90 TAB | Refills: 0 | Status: SHIPPED | OUTPATIENT
Start: 2020-10-16

## 2020-10-16 RX ORDER — ENALAPRILAT 1.25 MG/ML
1.25 INJECTION INTRAVENOUS ONCE
Status: COMPLETED | OUTPATIENT
Start: 2020-10-16 | End: 2020-10-16

## 2020-10-16 RX ORDER — CEPHALEXIN 500 MG/1
500 CAPSULE ORAL 4 TIMES DAILY
Qty: 28 CAP | Refills: 0 | Status: SHIPPED | OUTPATIENT
Start: 2020-10-16

## 2020-10-16 RX ORDER — CLONIDINE HYDROCHLORIDE 0.1 MG/1
0.1 TABLET ORAL ONCE
Status: COMPLETED | OUTPATIENT
Start: 2020-10-16 | End: 2020-10-16

## 2020-10-16 RX ADMIN — CLONIDINE HYDROCHLORIDE 0.1 MG: 0.1 TABLET ORAL at 11:09

## 2020-10-16 RX ADMIN — ENALAPRILAT 1.25 MG: 1.25 INJECTION INTRAVENOUS at 14:11

## 2020-10-16 RX ADMIN — CEFTRIAXONE SODIUM 2 G: 2 INJECTION, POWDER, FOR SOLUTION INTRAMUSCULAR; INTRAVENOUS at 14:33

## 2020-10-16 ASSESSMENT — ENCOUNTER SYMPTOMS
ABDOMINAL PAIN: 0
FEVER: 0
EYE DISCHARGE: 0
SENSORY CHANGE: 0
CHILLS: 0
HEADACHES: 0
WHEEZING: 0
EYE PAIN: 0
CLAUDICATION: 0
ORTHOPNEA: 0
DIZZINESS: 0
SHORTNESS OF BREATH: 0
BLURRED VISION: 0
FLANK PAIN: 0
EYE REDNESS: 0
MYALGIAS: 1
COUGH: 0
PHOTOPHOBIA: 0
DIARRHEA: 0
PND: 0
VOMITING: 0
PALPITATIONS: 0
DOUBLE VISION: 0
NAUSEA: 0

## 2020-10-16 NOTE — LETTER
October 16, 2020    To Whom It May Concern:         This is confirmation that Jennifer Gannon attended his scheduled appointment with ALVAREZ Gutierrez on 10/16/20.    Your employee was seen in our clinic today.  A concern for COVID-19 has been identified and testing is in progress.  We are asking you to excuse absences as well following self-isolation protocol per Center for Disease Control (CDC).  You employee will be able to access test results through our electronic delivery system called [employer].     If the results of testing are negative, and once there has been no fever (> than 100.4 F)  without treatment, and no vomiting or diarrhea for at least 24 hours, then return to work is approved.    If the results of testing are positive than your employer will be contacted by the [facility] or [facility] department for further instructions on duration of self-isolation and return to work protocol.  In general, this will also follow the CDC guidelines with a minimum of 10 days from the onset of symptoms and without fever, vomiting, or diarrhea as above.     In general, repeat testing is NOT necessary and not offered through the [facility] care.     This is the only note that will be provided from the [facility] for this visit.  Your employee will require an appointment with a primary care provider if FMLA or disability forms are required.         If you have any questions please do not hesitate to call me at the phone number listed below.    Sincerely,          JIMBO Gutierrez.  [phone]

## 2020-10-16 NOTE — ED NOTES
Pt provided urine sample to ED tech and it had large amounts of georgie blood in it. Sample sent to lab. Will notify Charge RN.

## 2020-10-16 NOTE — PROGRESS NOTES
Subjective:     Jennifer Gannon is a 45 y.o. male who presents for Blood in Urine (states general discomfort no pain-large amounts of blood in urine)      HPI  Pt presents for evaluation of a new problem.  Jennifer presents to the urgent care today with complaints of large amount of blood in his urine that started today.  He states that he did hold his urine for a long period of time as he was trying to wait for his break at work.  When he finally did go there was a large amount of blood.  He denies any painful urination, urgency or frequency.  Negative for lower back pain.  Negative for fever or chills.  His blood pressure upon arrival was 246/156.  He states that he has not taken his blood pressure medication in over 2 years has he did not have health insurance.  He denies any known health illnesses or disorders.    Review of Systems   Constitutional: Negative for chills, fever and malaise/fatigue.   Eyes: Negative for blurred vision, double vision, photophobia, pain, discharge and redness.   Respiratory: Negative for cough, shortness of breath and wheezing.    Cardiovascular: Negative for chest pain, palpitations, orthopnea, claudication, leg swelling and PND.   Gastrointestinal: Negative for abdominal pain, diarrhea, nausea and vomiting.   Genitourinary: Positive for hematuria. Negative for dysuria, flank pain, frequency and urgency.   Musculoskeletal: Positive for myalgias.   Neurological: Negative for dizziness, sensory change and headaches.   All other systems reviewed and are negative.      PMH: History reviewed. No pertinent past medical history.  ALLERGIES: No Known Allergies  SURGHX: History reviewed. No pertinent surgical history.  SOCHX:   Social History     Socioeconomic History   • Marital status:      Spouse name: Not on file   • Number of children: Not on file   • Years of education: Not on file   • Highest education level: Not on file   Occupational History   • Not on file   Social Needs   •  "Financial resource strain: Not on file   • Food insecurity     Worry: Not on file     Inability: Not on file   • Transportation needs     Medical: Not on file     Non-medical: Not on file   Tobacco Use   • Smoking status: Former Smoker     Types: Cigarettes   • Smokeless tobacco: Current User     Types: Chew   Substance and Sexual Activity   • Alcohol use: Yes     Alcohol/week: 0.6 oz     Types: 1 Cans of beer per week   • Drug use: No   • Sexual activity: Not on file   Lifestyle   • Physical activity     Days per week: Not on file     Minutes per session: Not on file   • Stress: Not on file   Relationships   • Social connections     Talks on phone: Not on file     Gets together: Not on file     Attends [employer] service: Not on file     Active member of club or organization: Not on file     Attends meetings of clubs or organizations: Not on file     Relationship status: Not on file   • Intimate partner violence     Fear of current or ex partner: Not on file     Emotionally abused: Not on file     Physically abused: Not on file     Forced sexual activity: Not on file   Other Topics Concern   • Not on file   Social History Narrative   • Not on file     FH: History reviewed. No pertinent family history.      Objective:   BP (!) 242/140   Pulse 100   Temp 37 °C (98.6 °F)   Resp 20   Ht 1.702 m (5' 7\")   Wt 103.4 kg (228 lb)   SpO2 94%   BMI 35.71 kg/m²     Physical Exam  Vitals signs and nursing note reviewed.   Constitutional:       General: He is not in acute distress.     Appearance: Normal appearance. He is obese. He is not ill-appearing or toxic-appearing.   HENT:      Head: Normocephalic and atraumatic.      Right Ear: External ear normal.      Left Ear: External ear normal.      Nose: No congestion or rhinorrhea.      Mouth/Throat:      Mouth: Mucous membranes are moist.   Eyes:      Extraocular Movements: Extraocular movements intact.      Pupils: Pupils are equal, round, and reactive to light.   Neck: "      Musculoskeletal: Normal range of motion and neck supple.   Cardiovascular:      Rate and Rhythm: Regular rhythm. Tachycardia present.      Pulses: Normal pulses.      Heart sounds: Normal heart sounds. No murmur.   Pulmonary:      Effort: Pulmonary effort is normal. No respiratory distress.      Breath sounds: Normal breath sounds. No wheezing.   Abdominal:      General: Abdomen is flat and protuberant. Bowel sounds are normal. There is no distension.      Palpations: Abdomen is soft.      Tenderness: There is no abdominal tenderness. There is no right CVA tenderness, left CVA tenderness or guarding.   Musculoskeletal: Normal range of motion.   Skin:     General: Skin is warm and dry.      Capillary Refill: Capillary refill takes less than 2 seconds.   Neurological:      General: No focal deficit present.      Mental Status: He is alert and oriented to person, place, and time. Mental status is at baseline.   Psychiatric:         Mood and Affect: Mood normal.         Behavior: Behavior normal.         Thought Content: Thought content normal.         Judgment: Judgment normal.       POCT urine: Clarity turbid, CHU moderate, KET 15, blood large, protein > 300, URO 2.0, nitrites positive, leuks large  Assessment/Plan:   Assessment    1. Hypertensive crisis  cloNIDine (CATAPRES) tablet 0.1 mg    POCT Urinalysis    UC AMA/Refusal of Treatment   Clonidine given in urgent care.  This did not decrease his blood pressure.  Patient educated and urged to transfer to ER by EMS.  He declined EMS transportation as he does not have insurance and he feels fine to drive himself to the hospital.  Risks and benefits discussed including sudden cardiac death, heart attack or stroke.  He verbalizes understanding.  AMA form signed and patient states that he will seek care in ER.  His point-of-care urine in office could be due to infection or acute renal failure secondary to hypertension.  He does not exhibit signs of acute UTI, kidney  infection or prostatitis.

## 2020-10-16 NOTE — ED TRIAGE NOTES
Chief Complaint   Patient presents with   • Sent from Urgent Care   • Blood in Urine   • Hypertension   • Abdominal Pain     lower abdomen     Pt ambulated to triage for blood in urine earlier. Noticed bp high, pt said that he stopped taking his medication 2years ago due to health insurance.

## 2020-10-17 NOTE — ED PROVIDER NOTES
ED Provider Note    CHIEF COMPLAINT  Chief Complaint   Patient presents with   • Sent from Urgent Care   • Blood in Urine   • Hypertension   • Abdominal Pain     lower abdomen       HPI  Jennifer Gannon Jr. is a 45 y.o. male who presents to the emergency room today with complaints blood with urination, elevated blood pressure and lower abdominal pain.  Patient states it started when he was at work today he had urinate and held it in and then when he went to the bathroom and he started peeing he had blood in there and some minimal lower abdominal pain over his bladder which then resolved.  He told his boss about it when he was brought in by ambulance is noticed his blood pressure was quite elevated.  He has a history of hypertension and has been noncompliant his blood pressure medication for 2 years.  No chest pain or shortness of breath no fever chills.    REVIEW OF SYSTEMS  See HPI for further details. All other systems are negative.     PAST MEDICAL HISTORY  Past Medical History:   Diagnosis Date   • Hypertension        FAMILY HISTORY  [unfilled]    SOCIAL HISTORY  Social History     Socioeconomic History   • Marital status:      Spouse name: Not on file   • Number of children: Not on file   • Years of education: Not on file   • Highest education level: Not on file   Occupational History   • Not on file   Social Needs   • Financial resource strain: Not on file   • Food insecurity     Worry: Not on file     Inability: Not on file   • Transportation needs     Medical: Not on file     Non-medical: Not on file   Tobacco Use   • Smoking status: Former Smoker     Types: Cigarettes   • Smokeless tobacco: Current User     Types: Chew   Substance and Sexual Activity   • Alcohol use: Yes     Alcohol/week: 0.6 oz     Types: 1 Cans of beer per week   • Drug use: No   • Sexual activity: Not on file   Lifestyle   • Physical activity     Days per week: Not on file     Minutes per session: Not on file   • Stress: Not  "on file   Relationships   • Social connections     Talks on phone: Not on file     Gets together: Not on file     Attends [employer] service: Not on file     Active member of club or organization: Not on file     Attends meetings of clubs or organizations: Not on file     Relationship status: Not on file   • Intimate partner violence     Fear of current or ex partner: Not on file     Emotionally abused: Not on file     Physically abused: Not on file     Forced sexual activity: Not on file   Other Topics Concern   • Not on file   Social History Narrative   • Not on file       SURGICAL HISTORY  No past surgical history on file.    CURRENT MEDICATIONS  Home Medications     Reviewed by Nury Owen R.N. (Registered Nurse) on 10/16/20 at 1222  Med List Status: Complete   Medication Last Dose Status        Patient Jose Maria Taking any Medications                       ALLERGIES  No Known Allergies    PHYSICAL EXAM  VITAL SIGNS: BP (!) 164/96   Pulse 73   Temp 35.9 °C (96.6 °F) (Temporal)   Resp 17   Ht 1.702 m (5' 7\")   Wt 106.9 kg (235 lb 10.8 oz)   SpO2 91%   BMI 36.91 kg/m²       Constitutional: Well developed, Well nourished,  acute distress, Non-toxic appearance.   HENT: Normocephalic, Atraumatic, Bilateral external ears normal, Oropharynx moist, No oral exudates, Nose normal.   Eyes: PERRLA, EOMI, Conjunctiva normal, No discharge.   Neck: Normal range of motion, No tenderness, Supple, No stridor.   Lymphatic: No lymphadenopathy noted.   Cardiovascular: Normal heart rate, Normal rhythm, No murmurs, No rubs, No gallops.   Thorax & Lungs: Normal breath sounds, No respiratory distress, No wheezing, No chest tenderness.   Abdomen: Bowel sounds normal, Soft, No tenderness, No masses, No pulsatile masses.  No rebound, guarding or peritoneal signs noted  Skin: Warm, Dry, No erythema, No rash.   Back: No tenderness, No CVA tenderness.     Extremities: Intact distal pulses, No edema, No tenderness, No cyanosis, No " clubbing.   Musculoskeletal: Good range of motion in all major joints. No tenderness to palpation or major deformities noted.   Neurologic: Alert & oriented x 3, Normal motor function, Normal sensory function, No focal deficits noted.   Psychiatric: Affect normal, Judgment normal, Mood normal.     EKG  Normal sinus rhythm at 70 bpm, no ST elevation or depression, no widening of the QRS complex, good R wave progression, DE intervals are normal, no diagnostic Q waves.  This is a 12-lead EKG is no previous EKG available this time for comparison.    RADIOLOGY/PROCEDURES  No orders to display         COURSE & MEDICAL DECISION MAKING  Pertinent Labs & Imaging studies reviewed. (See chart for details)  Urine was positive for leukocytes and consistent with urinary tract infection.  Patient was given Rocephin here in emergency room placed on Keflex most likely has hemorrhagic cystitis.  Patient's blood pressure did improve 164/96 he was placed on losartan for home will need close follow-up and was given referral to [facility] or [facility] and will get a blood pressure cuff over-the-counter monitor his blood pressure.  Return here if develops fever increasing bleeding return of abdominal pain or further problems in the next 12 to 24 hours.  On reexamination prior to discharge he is feeling much improved and is asymptomatic discharge as above to home he verbalizes above instructions and need for follow-up.    FINAL IMPRESSION  1.  Acute hemorrhagic cystitis  2.  Hypertension  3.         Electronically signed by: Tao Allen D.O., 10/16/2020 8:53 PM

## 2020-10-18 LAB
BACTERIA UR CULT: ABNORMAL
BACTERIA UR CULT: ABNORMAL
SIGNIFICANT IND 70042: ABNORMAL
SITE SITE: ABNORMAL
SOURCE SOURCE: ABNORMAL

## 2020-10-22 NOTE — ED NOTES
ED Positive Culture Follow-up/Notification Note:    Date: 10/22/2020     Patient seen in the ED on 10/16/2020 for hematuria and hypertension.   1. Essential hypertension    2. Acute cystitis with hematuria       Discharge Medication List as of 10/16/2020  3:17 PM      START taking these medications    Details   losartan (COZAAR) 100 MG Tab Take 1 Tab by mouth every day., Disp-90 Tab,R-0, Print Rx Paper      cephALEXin (KEFLEX) 500 MG Cap Take 1 Cap by mouth 4 times a day., Disp-28 Cap,R-0, Print Rx Paper             Allergies: Patient has no known allergies.     Vitals:    10/16/20 1411 10/16/20 1430 10/16/20 1434 10/16/20 1500   BP: (!) 180/115 (!) 177/104 154/103 (!) 164/96   Pulse: 80 77 76 73   Resp:   19 17   Temp:       TempSrc:       SpO2: 96% 93% 94% 91%   Weight:       Height:           Final cultures:   Results     Procedure Component Value Units Date/Time    URINE CULTURE(NEW) [[id number]]  (Abnormal) Collected: 10/16/20 1302    Order Status: Completed Specimen: Urine Updated: 10/18/20 0727     Significant Indicator POS     Source UR     Site -     Culture Result -      Streptococcus agalactiae (Group B)  10-50,000 cfu/mL      URINALYSIS,CULTURE IF INDICATED [[id number]]  (Abnormal) Collected: 10/16/20 1302    Order Status: Completed Specimen: Blood from Urine, Clean Catch Updated: 10/16/20 1405     Color Red     Character Cloudy     Specific Gravity 1.025     Ph 8.5     Glucose Negative mg/dL      Ketones Negative mg/dL      Protein 100 mg/dL      Bilirubin Negative     Urobilinogen, Urine 1.0     Nitrite Negative     Leukocyte Esterase Negative     Occult Blood Small     Micro Urine Req Microscopic          Plan:   Appropriate antibiotic therapy prescribed. No changes required based upon culture result.      Julia Hawley, PharmD

## 2021-11-10 ENCOUNTER — NON-PROVIDER VISIT (OUTPATIENT)
Dept: URGENT CARE | Facility: PHYSICIAN GROUP | Age: 46
End: 2021-11-10

## 2021-11-10 DIAGNOSIS — Z02.1 PRE-EMPLOYMENT DRUG SCREENING: ICD-10-CM

## 2021-11-10 LAB
AMP AMPHETAMINE: NORMAL
BAR BARBITURATES: NORMAL
BZO BENZODIAZEPINES: NORMAL
COC COCAINE: NORMAL
INT CON NEG: NORMAL
INT CON POS: NORMAL
MDMA ECSTASY: NORMAL
MET METHAMPHETAMINES: NORMAL
MTD METHADONE: NORMAL
OPI OPIATES: NORMAL
OXY OXYCODONE: NORMAL
PCP PHENCYCLIDINE: NORMAL
POC URINE DRUG SCREEN OCDRS: NEGATIVE
THC: NORMAL

## 2021-11-10 PROCEDURE — 80305 DRUG TEST PRSMV DIR OPT OBS: CPT | Performed by: FAMILY MEDICINE
